# Patient Record
Sex: MALE | Race: WHITE | NOT HISPANIC OR LATINO | Employment: OTHER | RURAL
[De-identification: names, ages, dates, MRNs, and addresses within clinical notes are randomized per-mention and may not be internally consistent; named-entity substitution may affect disease eponyms.]

---

## 2024-07-31 ENCOUNTER — TRANSCRIBE ORDERS (OUTPATIENT)
Dept: PHYSICAL THERAPY | Facility: CLINIC | Age: 74
End: 2024-07-31
Payer: OTHER GOVERNMENT

## 2024-07-31 DIAGNOSIS — M54.41 LOW BACK PAIN WITH BILATERAL SCIATICA, UNSPECIFIED BACK PAIN LATERALITY, UNSPECIFIED CHRONICITY: Primary | ICD-10-CM

## 2024-07-31 DIAGNOSIS — M54.42 LOW BACK PAIN WITH BILATERAL SCIATICA, UNSPECIFIED BACK PAIN LATERALITY, UNSPECIFIED CHRONICITY: Primary | ICD-10-CM

## 2024-08-05 ENCOUNTER — TREATMENT (OUTPATIENT)
Dept: PHYSICAL THERAPY | Facility: CLINIC | Age: 74
End: 2024-08-05
Payer: OTHER GOVERNMENT

## 2024-08-05 DIAGNOSIS — M54.42 BILATERAL LOW BACK PAIN WITH BILATERAL SCIATICA, UNSPECIFIED CHRONICITY: Primary | ICD-10-CM

## 2024-08-05 DIAGNOSIS — M54.41 BILATERAL LOW BACK PAIN WITH BILATERAL SCIATICA, UNSPECIFIED CHRONICITY: Primary | ICD-10-CM

## 2024-08-05 PROCEDURE — 97112 NEUROMUSCULAR REEDUCATION: CPT | Performed by: PHYSICAL THERAPIST

## 2024-08-05 PROCEDURE — 97140 MANUAL THERAPY 1/> REGIONS: CPT | Performed by: PHYSICAL THERAPIST

## 2024-08-05 PROCEDURE — 97161 PT EVAL LOW COMPLEX 20 MIN: CPT | Performed by: PHYSICAL THERAPIST

## 2024-08-05 PROCEDURE — 97110 THERAPEUTIC EXERCISES: CPT | Performed by: PHYSICAL THERAPIST

## 2024-08-05 NOTE — PROGRESS NOTES
Physical Therapy Initial Evaluation and Plan of Care  [address] [facility], [address], [place]  [address]    Patient: Tima Bowman   : 1950  Diagnosis/ICD-10 Code:  Bilateral low back pain with bilateral sciatica, unspecified chronicity [M54.42, M54.41]  Referring practitioner: Migdalia Velazquez*  Date of Initial Visit: 2024  Today's Date: 2024  Patient seen for 1 sessions           Subjective Questionnaire: Oswestry: 30%      Subjective Evaluation    History of Present Illness  Mechanism of injury: Patient is a 74 y.o. WM who presents with chronic LBP with exacerbation 3 weeks ago of B radicular symptoms to calves upon standing/walking after sitting.  Pain has started to improve recently.  He started taking a new medication for nerve pain recently.  Personal goals are to decrease pain and get back to normal walking.  Pain rating ranges from 0/10 in sitting or supine to 8/10 upon initial walking.  He has a stationary bike at home but doesn't use it.  [place], [place] and active.  Active with [employer], served in [employer].    Patient Goals  Patient goals for therapy: decreased pain, improved balance, increased motion, increased strength and return to sport/leisure activities             Objective Oswestry function score indicates 30% impairment with limitations in walking, sleeping, pain.  AROM: flexion lacks 50%, extension lacks 50%, SB and rot lacks 50% B.  MMT:  4/5 B hip flexor, knee extension, otherwise 5/5 B LE.  Repeated sit to stand = 10+ in 30 sec without arms.  SLS = 4 sec L, 7 sec R.  Flexibility: min tight hip flexor B, mod/max tight hamstring, piriformis B.  No tenderness to palpation or increased radicular symptoms.  Ambulates independently without AD with minimal antalgia.        Assessment & Plan       Assessment  Impairments: abnormal gait, abnormal or restricted ROM, impaired balance, impaired physical strength, lacks appropriate home exercise program  and pain with function   Functional limitations: sleeping, walking, uncomfortable because of pain and standing     Goals  Plan Goals: Patient independent with HEP in 2 weeks  Tolerate 10 min Nustep in 2 weeks  Decrease back and leg pain 25% in 2 weeks  Able to ambulate independently without AD and minimal antalgia in 2 weeks  Improve function as evidenced by Oswestry score of 20% or less by discharge (30% impairment initially)  Able to walk without pain in 12 weeks      Plan  Therapy options: will be seen for skilled therapy services  Other planned modality interventions: physical modalities as needed  Planned therapy interventions: abdominal trunk stabilization, balance/weight-bearing training, flexibility, functional ROM exercises, gait training, home exercise program, manual therapy, neuromuscular re-education, postural training, strengthening, stretching and therapeutic activities  Frequency: 2x week  Duration in weeks: 12  Treatment plan discussed with: patient        Timed:         Manual Therapy:   10      mins  16884;     Therapeutic Exercise:    15     mins  16748;     Neuromuscular Tessie:    15    mins  08563;    Therapeutic Activity:          mins  96665;     Gait Training:           mins  85007;     Ultrasound:          mins  32975;    Self-care  ____ mins 62967    Un-Timed:  Electrical Stimulation:         mins  80843 ( );  Dry Needling          mins self-pay 86052  Traction          mins 90605  Low Eval     20     Mins  97594  Mod Eval          Mins  96688  Canal repositioning _____ mins  46058        Timed Treatment:   40   mins   Total Treatment:     60   mins    PT SIGNATURE: Robin A Sprigler, PT   [place] license # [id number]  Electronically signed by Robin A Sprigler, PT, 08/05/24, 9:13 AM EDT    Initial Certification  Certification Period: 8/5/2024 through 11/2/2024  I certify that the therapy services are furnished while this patient is under my care.  The services outlined above are required by  this patient, and will be reviewed every 90 days.     PHYSICIAN: Migdalia Velazquez, DO ______________________________________________________________________________________________     DATE: _________________________________________________________________________________________________________________________________    Please sign and return via fax to [phone]. Thank you, [facility].

## 2024-08-08 ENCOUNTER — TREATMENT (OUTPATIENT)
Dept: PHYSICAL THERAPY | Facility: CLINIC | Age: 74
End: 2024-08-08
Payer: OTHER GOVERNMENT

## 2024-08-08 DIAGNOSIS — M54.41 BILATERAL LOW BACK PAIN WITH BILATERAL SCIATICA, UNSPECIFIED CHRONICITY: Primary | ICD-10-CM

## 2024-08-08 DIAGNOSIS — M54.42 BILATERAL LOW BACK PAIN WITH BILATERAL SCIATICA, UNSPECIFIED CHRONICITY: Primary | ICD-10-CM

## 2024-08-08 NOTE — PROGRESS NOTES
Physical Therapy Daily Treatment Note    Patient: Tima Bowman   : 1950  Diagnosis/ICD-10 Code:  Bilateral low back pain with bilateral sciatica, unspecified chronicity [M54.42, M54.41]  Referring practitioner: Migdalia Velazquez*  Date of Initial Visit: Type: THERAPY  Noted: 2024  Today's Date: 2024  Patient seen for 2 sessions             Subjective Patient reports nothing new with symptoms yet.    Objective   See Exercise, Manual, and Modality Logs for complete treatment.  Initial Oswestry function score indicates 30% impairment with limitations in walking, sleeping, pain.  AROM: flexion lacks 50%, extension lacks 50%, SB and rot lacks 50% B.  MMT:  4/5 B hip flexor, knee extension, otherwise 5/5 B LE.  Repeated sit to stand = 10+ in 30 sec without arms.  SLS = 4 sec L, 7 sec R.  Flexibility: min tight hip flexor B, mod/max tight hamstring, piriformis B.  No tenderness to palpation or increased radicular symptoms.  Ambulates independently without AD with minimal antalgia.  Reviewed and updated HEP with rows/lats, marching, leg press.           Assessment/Plan  Patient independent with HEP in 2 weeks - NOT MET  Tolerate 10 min Nustep in 2 weeks - MET  Decrease back and leg pain 25% in 2 weeks - NOT MET  Able to ambulate independently without AD and minimal antalgia in 2 weeks - NOT MET  Improve function as evidenced by Oswestry score of 20% or less by discharge (30% impairment initially) - NOT MET  Able to walk without pain in 12 weeks - NOT MET     Progress per Plan of Care exp 24           Timed:         Manual Therapy:    8     mins  15702;     Therapeutic Exercise:    15     mins  69732;     Neuromuscular Tessie:    15    mins  25987;    Therapeutic Activity:          mins  61745;     Gait Training:           mins  94892;     Ultrasound:          mins  49661;    Ionto                                   mins   83424  Self Care                            mins    32021      Un-Timed:  Electrical Stimulation:         mins  78591 ( );  Dry Needling          mins self-pay  Traction          mins 70076  Low Eval          Mins  95539  Mod Eval          Mins  15539  High Eval                            Mins  57570  Canalith Repos                   mins  11100    Timed Treatment:   38   mins   Total Treatment:     38   mins    Robin A Sprigler, PT  Physical Therapist

## 2024-08-12 ENCOUNTER — TREATMENT (OUTPATIENT)
Dept: PHYSICAL THERAPY | Facility: CLINIC | Age: 74
End: 2024-08-12
Payer: OTHER GOVERNMENT

## 2024-08-12 DIAGNOSIS — M54.42 BILATERAL LOW BACK PAIN WITH BILATERAL SCIATICA, UNSPECIFIED CHRONICITY: Primary | ICD-10-CM

## 2024-08-12 DIAGNOSIS — M54.41 BILATERAL LOW BACK PAIN WITH BILATERAL SCIATICA, UNSPECIFIED CHRONICITY: Primary | ICD-10-CM

## 2024-08-12 NOTE — PROGRESS NOTES
Physical Therapy Daily Treatment Note    Patient: Tima Bowman   : 1950  Diagnosis/ICD-10 Code:  Bilateral low back pain with bilateral sciatica, unspecified chronicity [M54.42, M54.41]  Referring practitioner: Migdalia Velazquez*  Date of Initial Visit: Type: THERAPY  Noted: 2024  Today's Date: 2024  Patient seen for 3 sessions             Subjective Patient reports nothing new with symptoms yet.     Objective   See Exercise, Manual, and Modality Logs for complete treatment. Initial Oswestry function score indicates 30% impairment with limitations in walking, sleeping, pain.  AROM: flexion lacks 50%, extension lacks 50%, SB and rot lacks 50% B.  MMT:  4/5 B hip flexor, knee extension, otherwise 5/5 B LE.  Deconditioned abdominals with protruding abdomen.  Repeated sit to stand = 10+ in 30 sec without arms.  SLS = 10 sec L, 10 sec R.  Flexibility: min tight hip flexor B, mod/max tight hamstring, piriformis B.  No tenderness to palpation or increased radicular symptoms.  Ambulates independently without AD with minimal antalgia.  Reviewed and updated HEP with rows/lats, marching, leg press.           Assessment/Plan  Patient independent with HEP in 2 weeks - MET  Tolerate 10 min Nustep in 2 weeks - MET  Decrease back and leg pain 25% in 2 weeks - NOT MET  Able to ambulate independently without AD and minimal antalgia in 2 weeks - NOT MET  Improve function as evidenced by Oswestry score of 20% or less by discharge (30% impairment initially) - NOT MET  Able to walk without pain in 12 weeks - NOT MET    Progress per Plan of Care exp 10/28/24           Timed:         Manual Therapy:    8     mins  16205;     Therapeutic Exercise:    15     mins  55893;     Neuromuscular Tessie:    15    mins  68965;    Therapeutic Activity:          mins  99435;     Gait Training:           mins  01866;     Ultrasound:          mins  28391;    Ionto                                   mins   24225  Self Care                             mins   00409      Un-Timed:  Electrical Stimulation:         mins  21696 ( );  Dry Needling          mins self-pay  Traction          mins 38749  Low Eval          Mins  98639  Mod Eval          Mins  33194  High Eval                            Mins  56027  Canalith Repos                   mins  22604    Timed Treatment:    38  mins   Total Treatment:     38   mins    Robin A Sprigler, PT  Physical Therapist

## 2024-08-15 ENCOUNTER — TREATMENT (OUTPATIENT)
Dept: PHYSICAL THERAPY | Facility: CLINIC | Age: 74
End: 2024-08-15
Payer: OTHER GOVERNMENT

## 2024-08-15 DIAGNOSIS — M54.42 BILATERAL LOW BACK PAIN WITH BILATERAL SCIATICA, UNSPECIFIED CHRONICITY: Primary | ICD-10-CM

## 2024-08-15 DIAGNOSIS — M54.41 BILATERAL LOW BACK PAIN WITH BILATERAL SCIATICA, UNSPECIFIED CHRONICITY: Primary | ICD-10-CM

## 2024-08-15 NOTE — PROGRESS NOTES
Physical Therapy Daily Treatment Note    Patient: Tima Bowman   : 1950  Diagnosis/ICD-10 Code:  Bilateral low back pain with bilateral sciatica, unspecified chronicity [M54.42, M54.41]  Referring practitioner: Migdalia Velazquez*  Date of Initial Visit: Type: THERAPY  Noted: 2024  Today's Date: 8/15/2024  Patient seen for 4 sessions             Subjective Patient reports he's feeling better and planning to go to [place] on vacation now that he can walk better without pain.  He reports pain is primarily in bed or after sitting prolonged and first standing up, otherwise feels good.  No pain in legs.    Objective   See Exercise, Manual, and Modality Logs for complete treatment. Initial Oswestry function score indicates 30% impairment with limitations in walking, sleeping, pain.  AROM: flexion lacks 50%, extension lacks 50%, SB and rot lacks 50% B.  MMT:  4/5 B hip flexor, knee extension, otherwise 5/5 B LE.  Deconditioned abdominals with protruding abdomen.  Repeated sit to stand = 10+ in 30 sec without arms.  SLS = 10 sec L, 10 sec R.  Flexibility: min tight hip flexor B, mod/max tight hamstring, piriformis B.  No tenderness to palpation or increased radicular symptoms.  Ambulates independently without AD with minimal antalgia.  Reviewed and updated HEP with rows/lats, marching, leg press.           Assessment/Plan  Patient independent with HEP in 2 weeks - MET  Tolerate 10 min Nustep in 2 weeks - MET  Decrease back and leg pain 25% in 2 weeks - MET  Able to ambulate independently without AD and minimal antalgia in 2 weeks - NOT MET  Improve function as evidenced by Oswestry score of 20% or less by discharge (30% impairment initially) - NOT MET  Able to walk without pain in 12 weeks - PARTIALLY MET     Progress per Plan of Care exp 10/28/24            Timed:         Manual Therapy:    8     mins  80068;     Therapeutic Exercise:    15     mins  86526;     Neuromuscular Tessie:    15    mins   27416;    Therapeutic Activity:          mins  22610;     Gait Training:           mins  83043;     Ultrasound:          mins  99087;    Ionto                                   mins   76388  Self Care                            mins   59733      Un-Timed:  Electrical Stimulation:         mins  12559 ( );  Dry Needling          mins self-pay  Traction          mins 14259  Low Eval          Mins  57586  Mod Eval          Mins  77415  High Eval                            Mins  00414  Canalith Repos                   mins  45495    Timed Treatment:   38   mins   Total Treatment:     38   mins    Robin A Sprigler, PT  Physical Therapist

## 2024-08-19 ENCOUNTER — TREATMENT (OUTPATIENT)
Dept: PHYSICAL THERAPY | Facility: CLINIC | Age: 74
End: 2024-08-19
Payer: OTHER GOVERNMENT

## 2024-08-19 DIAGNOSIS — M54.42 BILATERAL LOW BACK PAIN WITH BILATERAL SCIATICA, UNSPECIFIED CHRONICITY: Primary | ICD-10-CM

## 2024-08-19 DIAGNOSIS — M54.41 BILATERAL LOW BACK PAIN WITH BILATERAL SCIATICA, UNSPECIFIED CHRONICITY: Primary | ICD-10-CM

## 2024-08-19 NOTE — PROGRESS NOTES
Physical Therapy Daily Treatment Note    Patient: Tima Bowman   : 1950  Diagnosis/ICD-10 Code:  Bilateral low back pain with bilateral sciatica, unspecified chronicity [M54.42, M54.41]  Referring practitioner: Migdalia Velazquez*  Date of Initial Visit: Type: THERAPY  Noted: 2024  Today's Date: 2024  Patient seen for 5 sessions             Subjective Still feeling good, had a nice weekend.  Leaves for vacation to [place] in 2 weeks.    Objective   See Exercise, Manual, and Modality Logs for complete treatment. Initial Oswestry function score indicates 30% impairment with limitations in walking, sleeping, pain. AROM: flexion lacks 50%, extension lacks 50%, SB and rot lacks 50% B. MMT: 4/5 B hip flexor, knee extension, otherwise 5/5 B LE. Deconditioned abdominals with protruding abdomen. Repeated sit to stand = 10+ in 30 sec without arms. SLS = 10 sec L, 10 sec R. Flexibility: min tight hip flexor B, mod/max tight hamstring, piriformis B. No tenderness to palpation or increased radicular symptoms. Ambulates independently without AD with minimal antalgia. Reviewed and updated HEP.       Assessment/Plan  Patient independent with HEP in 2 weeks - MET  Tolerate 10 min Nustep in 2 weeks - MET  Decrease back and leg pain 25% in 2 weeks - MET  Able to ambulate independently without AD and minimal antalgia in 2 weeks - MET  Improve function as evidenced by Oswestry score of 20% or less by discharge (30% impairment initially) - NOT MET  Able to walk without pain in 12 weeks - PARTIALLY MET     Progress per Plan of Care exp 10/28/24            Timed:         Manual Therapy:    8     mins  83364;     Therapeutic Exercise:    15     mins  46115;     Neuromuscular Tessie:    15    mins  75293;    Therapeutic Activity:          mins  69467;     Gait Training:           mins  61307;     Ultrasound:          mins  23647;    Ionto                                   mins   15728  Self Care                             mins   94807      Un-Timed:  Electrical Stimulation:         mins  08715 ( );  Dry Needling          mins self-pay  Traction          mins 73572  Low Eval          Mins  14836  Mod Eval          Mins  28226  High Eval                            Mins  74925  Canalith Repos                   mins  31728    Timed Treatment:   38   mins   Total Treatment:     38   mins    Robin A Sprigler, PT  Physical Therapist

## 2024-08-22 ENCOUNTER — TREATMENT (OUTPATIENT)
Dept: PHYSICAL THERAPY | Facility: CLINIC | Age: 74
End: 2024-08-22
Payer: OTHER GOVERNMENT

## 2024-08-22 DIAGNOSIS — M54.41 BILATERAL LOW BACK PAIN WITH BILATERAL SCIATICA, UNSPECIFIED CHRONICITY: Primary | ICD-10-CM

## 2024-08-22 DIAGNOSIS — M54.42 BILATERAL LOW BACK PAIN WITH BILATERAL SCIATICA, UNSPECIFIED CHRONICITY: Primary | ICD-10-CM

## 2024-08-22 NOTE — PROGRESS NOTES
Physical Therapy Daily Treatment Note    Patient: Tima Bowman   : 1950  Diagnosis/ICD-10 Code:  Bilateral low back pain with bilateral sciatica, unspecified chronicity [M54.42, M54.41]  Referring practitioner: Migdalia Velazquez*  Date of Initial Visit: Type: THERAPY  Noted: 2024  Today's Date: 2024  Patient seen for 6 sessions             Subjective No new complaints today.    Objective   See Exercise, Manual, and Modality Logs for complete treatment. Initial Oswestry function score indicates 30% impairment with limitations in walking, sleeping, pain. AROM: flexion lacks 50%, extension lacks 50%, SB and rot lacks 50% B. MMT: 4/5 B hip flexor, knee extension, otherwise 5/5 B LE. Deconditioned abdominals with protruding abdomen. Repeated sit to stand = 10+ in 30 sec without arms. SLS = 16 sec L, 19 sec R. Flexibility: min tight hip flexor B, mod/max tight hamstring, piriformis B. No tenderness to palpation or increased radicular symptoms. Ambulates independently without AD with minimal antalgia. Reviewed and updated HEP.       Assessment/Plan  Patient independent with HEP in 2 weeks - MET  Tolerate 10 min Nustep in 2 weeks - MET  Decrease back and leg pain 25% in 2 weeks - MET  Able to ambulate independently without AD and minimal antalgia in 2 weeks - MET  Improve function as evidenced by Oswestry score of 20% or less by discharge (30% impairment initially) - NOT MET  Able to walk without pain in 12 weeks - PARTIALLY MET     Progress per Plan of Care exp 10/28/24          Timed:         Manual Therapy:    8     mins  08616;     Therapeutic Exercise:    15     mins  94524;     Neuromuscular Tessie:    15    mins  69012;    Therapeutic Activity:          mins  05213;     Gait Training:           mins  65622;     Ultrasound:          mins  88370;    Ionto                                   mins   53151  Self Care                            mins   08858      Un-Timed:  Electrical Stimulation:         mins  65496 ( );  Dry Needling          mins self-pay  Traction          mins 61761  Low Eval          Mins  11219  Mod Eval          Mins  39330  High Eval                            Mins  30577  Canalith Repos                   mins  75612    Timed Treatment:   38   mins   Total Treatment:     38   mins    Robin A Sprigler, PT  Physical Therapist

## 2024-08-26 ENCOUNTER — TREATMENT (OUTPATIENT)
Dept: PHYSICAL THERAPY | Facility: CLINIC | Age: 74
End: 2024-08-26
Payer: OTHER GOVERNMENT

## 2024-08-26 DIAGNOSIS — M54.41 BILATERAL LOW BACK PAIN WITH BILATERAL SCIATICA, UNSPECIFIED CHRONICITY: Primary | ICD-10-CM

## 2024-08-26 DIAGNOSIS — M54.42 BILATERAL LOW BACK PAIN WITH BILATERAL SCIATICA, UNSPECIFIED CHRONICITY: Primary | ICD-10-CM

## 2024-08-26 NOTE — PROGRESS NOTES
Physical Therapy Daily Treatment Note    Patient: Tima Bowman   : 1950  Diagnosis/ICD-10 Code:  Bilateral low back pain with bilateral sciatica, unspecified chronicity [M54.42, M54.41]  Referring practitioner: Migdalia Velazquez*  Date of Initial Visit: Type: THERAPY  Noted: 2024  Today's Date: 2024  Patient seen for 7 sessions             Subjective Patient reports he had a good weekend but ready for cooler temperatures.  Looking forward to trip to [place] next week.    Objective   See Exercise, Manual, and Modality Logs for complete treatment.  Initial Oswestry function score indicates 30% impairment with limitations in walking, sleeping, pain. AROM: flexion lacks 50%, extension lacks 50%, SB and rot lacks 50% B. MMT: 4/5 B hip flexor, knee extension, otherwise 5/5 B LE. Deconditioned abdominals with protruding abdomen. Repeated sit to stand = 10+ in 30 sec without arms. SLS = 19 sec L, 30 sec R. Flexibility: min tight hip flexor B, mod/max tight hamstring, piriformis B. No tenderness to palpation or increased radicular symptoms. Ambulates independently without AD with minimal antalgia. Reviewed and updated HEP.       Assessment/Plan  Patient independent with HEP in 2 weeks - MET  Tolerate 10 min Nustep in 2 weeks - MET  Decrease back and leg pain 25% in 2 weeks - MET  Able to ambulate independently without AD and minimal antalgia in 2 weeks - MET  Improve function as evidenced by Oswestry score of 20% or less by discharge (30% impairment initially) - NOT MET  Able to walk without pain in 12 weeks - PARTIALLY MET     Progress per Plan of Care exp 10/28/24           Timed:         Manual Therapy:    8     mins  75957;     Therapeutic Exercise:    15     mins  19995;     Neuromuscular Tessie:    15    mins  10082;    Therapeutic Activity:          mins  85703;     Gait Training:           mins  63524;     Ultrasound:          mins  14357;    Ionto                                   mins    75394  Self Care                            mins   55683      Un-Timed:  Electrical Stimulation:         mins  26677 ( );  Dry Needling          mins self-pay  Traction          mins 46930  Low Eval          Mins  28651  Mod Eval          Mins  83143  High Eval                            Mins  59523  Canalith Repos                   mins  36921    Timed Treatment:   38   mins   Total Treatment:     38   mins    Robin A Sprigler, PT  Physical Therapist

## 2024-08-29 ENCOUNTER — TREATMENT (OUTPATIENT)
Dept: PHYSICAL THERAPY | Facility: CLINIC | Age: 74
End: 2024-08-29
Payer: OTHER GOVERNMENT

## 2024-08-29 DIAGNOSIS — M54.41 BILATERAL LOW BACK PAIN WITH BILATERAL SCIATICA, UNSPECIFIED CHRONICITY: Primary | ICD-10-CM

## 2024-08-29 DIAGNOSIS — M54.42 BILATERAL LOW BACK PAIN WITH BILATERAL SCIATICA, UNSPECIFIED CHRONICITY: Primary | ICD-10-CM

## 2024-08-29 NOTE — PROGRESS NOTES
Physical Therapy Daily Treatment Note    Patient: Tima Bowman   : 1950  Diagnosis/ICD-10 Code:  Bilateral low back pain with bilateral sciatica, unspecified chronicity [M54.42, M54.41]  Referring practitioner: Migdalia Velazquez*  Date of Initial Visit: Type: THERAPY  Noted: 2024  Today's Date: 2024  Patient seen for 8 sessions             Subjective Patient looking forward to [place] vacation.    Objective   See Exercise, Manual, and Modality Logs for complete treatment. Oswestry function score indicates 24% impairment, improved from 30% initially with limitations in walking, sleeping, pain. AROM: flexion lacks 50%, extension lacks 50%, SB and rot lacks 50% B. MMT: 4/5 B hip flexor, knee extension, otherwise 5/5 B LE. Deconditioned abdominals with protruding abdomen. Repeated sit to stand = 10+ in 30 sec without arms. SLS = 19 sec L, 30 sec R. Flexibility: min tight hip flexor B, mod/max tight hamstring, piriformis B. No tenderness to palpation or increased radicular symptoms. Ambulates independently without AD with minimal antalgia. Reviewed and updated HEP.       Assessment/Plan  Patient independent with HEP in 2 weeks - MET  Tolerate 10 min Nustep in 2 weeks - MET  Decrease back and leg pain 25% in 2 weeks - MET  Able to ambulate independently without AD and minimal antalgia in 2 weeks - MET  Improve function as evidenced by Oswestry score of 20% or less by discharge (30% impairment initially) - PARTIALLY MET, 24% on 24  Able to walk without pain in 12 weeks - PARTIALLY MET    Progress per Plan of Care exp 10/28/24            Timed:         Manual Therapy:    8     mins  77225;     Therapeutic Exercise:    15     mins  31311;     Neuromuscular Tessie:    15    mins  69067;    Therapeutic Activity:          mins  87071;     Gait Training:           mins  99287;     Ultrasound:          mins  99498;    Ionto                                   mins   67414  Self Care                             mins   89018      Un-Timed:  Electrical Stimulation:         mins  77225 ( );  Dry Needling          mins self-pay  Traction          mins 49682  Low Eval          Mins  91115  Mod Eval          Mins  42599  High Eval                            Mins  79191  Canalith Repos                   mins  27142    Timed Treatment:   38   mins   Total Treatment:     38   mins    Robin A Sprigler, PT  Physical Therapist

## 2024-09-16 ENCOUNTER — TREATMENT (OUTPATIENT)
Dept: PHYSICAL THERAPY | Facility: CLINIC | Age: 74
End: 2024-09-16
Payer: OTHER GOVERNMENT

## 2024-09-16 DIAGNOSIS — M54.42 BILATERAL LOW BACK PAIN WITH BILATERAL SCIATICA, UNSPECIFIED CHRONICITY: Primary | ICD-10-CM

## 2024-09-16 DIAGNOSIS — M54.41 BILATERAL LOW BACK PAIN WITH BILATERAL SCIATICA, UNSPECIFIED CHRONICITY: Primary | ICD-10-CM

## 2024-09-16 PROCEDURE — 97112 NEUROMUSCULAR REEDUCATION: CPT | Performed by: PHYSICAL THERAPIST

## 2024-09-16 PROCEDURE — 97140 MANUAL THERAPY 1/> REGIONS: CPT | Performed by: PHYSICAL THERAPIST

## 2024-09-16 PROCEDURE — 97110 THERAPEUTIC EXERCISES: CPT | Performed by: PHYSICAL THERAPIST

## 2024-09-19 ENCOUNTER — TREATMENT (OUTPATIENT)
Dept: PHYSICAL THERAPY | Facility: CLINIC | Age: 74
End: 2024-09-19
Payer: OTHER GOVERNMENT

## 2024-09-19 DIAGNOSIS — M54.42 BILATERAL LOW BACK PAIN WITH BILATERAL SCIATICA, UNSPECIFIED CHRONICITY: Primary | ICD-10-CM

## 2024-09-19 DIAGNOSIS — M54.41 BILATERAL LOW BACK PAIN WITH BILATERAL SCIATICA, UNSPECIFIED CHRONICITY: Primary | ICD-10-CM

## 2024-09-23 ENCOUNTER — TREATMENT (OUTPATIENT)
Dept: PHYSICAL THERAPY | Facility: CLINIC | Age: 74
End: 2024-09-23
Payer: OTHER GOVERNMENT

## 2024-09-23 DIAGNOSIS — M54.42 BILATERAL LOW BACK PAIN WITH BILATERAL SCIATICA, UNSPECIFIED CHRONICITY: Primary | ICD-10-CM

## 2024-09-23 DIAGNOSIS — M54.41 BILATERAL LOW BACK PAIN WITH BILATERAL SCIATICA, UNSPECIFIED CHRONICITY: Primary | ICD-10-CM

## 2024-09-23 PROCEDURE — 97110 THERAPEUTIC EXERCISES: CPT | Performed by: PHYSICAL THERAPIST

## 2024-09-23 PROCEDURE — 97112 NEUROMUSCULAR REEDUCATION: CPT | Performed by: PHYSICAL THERAPIST

## 2024-09-23 PROCEDURE — 97140 MANUAL THERAPY 1/> REGIONS: CPT | Performed by: PHYSICAL THERAPIST

## 2024-09-26 ENCOUNTER — TREATMENT (OUTPATIENT)
Dept: PHYSICAL THERAPY | Facility: CLINIC | Age: 74
End: 2024-09-26
Payer: OTHER GOVERNMENT

## 2024-09-26 DIAGNOSIS — M54.41 BILATERAL LOW BACK PAIN WITH BILATERAL SCIATICA, UNSPECIFIED CHRONICITY: Primary | ICD-10-CM

## 2024-09-26 DIAGNOSIS — M54.42 BILATERAL LOW BACK PAIN WITH BILATERAL SCIATICA, UNSPECIFIED CHRONICITY: Primary | ICD-10-CM

## 2024-10-02 ENCOUNTER — TREATMENT (OUTPATIENT)
Dept: PHYSICAL THERAPY | Facility: CLINIC | Age: 74
End: 2024-10-02
Payer: OTHER GOVERNMENT

## 2024-10-02 DIAGNOSIS — M54.42 BILATERAL LOW BACK PAIN WITH BILATERAL SCIATICA, UNSPECIFIED CHRONICITY: Primary | ICD-10-CM

## 2024-10-02 DIAGNOSIS — M54.41 BILATERAL LOW BACK PAIN WITH BILATERAL SCIATICA, UNSPECIFIED CHRONICITY: Primary | ICD-10-CM

## 2024-10-02 PROCEDURE — 97110 THERAPEUTIC EXERCISES: CPT

## 2024-10-02 PROCEDURE — 97140 MANUAL THERAPY 1/> REGIONS: CPT

## 2024-10-02 PROCEDURE — 97112 NEUROMUSCULAR REEDUCATION: CPT

## 2024-10-02 NOTE — PROGRESS NOTES
Physical Therapy Daily Treatment Note      Patient: Tima Bowman   : 1950  Diagnosis/ICD-10 Code:  Bilateral low back pain with bilateral sciatica, unspecified chronicity [M54.42, M54.41]  Referring practitioner: Migdalia Velazquez*  Date of Initial Visit: Type: THERAPY  Noted: 2024  Today's Date: 10/2/2024  Patient seen for 13 sessions         Tima Bowman reports: he has been doing okay stating the exercises don't seem to worsen his pain but his pain still varies daily sometimes In both legs sometimes in one not the other an occasionally only in his back which he understands is a good thing since it is no longer in the lower parts of his legs.    Objective   See Exercise, Manual, and Modality Logs for complete treatment.     Assessment/Plan  Pt. tolerates exercises well and has no difficulty completing them this date Pt. Is very tight in B HS's and hip flexors. Pt. Responds fair to stretches but struggles to relax full to receive good overpressure.    Patient independent with HEP in 2 weeks - MET  Tolerate 10 min Nustep in 2 weeks - MET  Decrease back and leg pain 25% in 2 weeks - MET  Able to ambulate independently without AD and minimal antalgia in 2 weeks - MET  Improve function as evidenced by Oswestry score of 20% or less by discharge (30% impairment initially) - PARTIALLY MET, 24% on 24  Able to walk without pain in 12 weeks - PARTIALLY MET    Progress strengthening /stabilization /functional activity           Timed:         Manual Therapy:    15     mins  66276;     Therapeutic Exercise:    15     mins  15266;     Neuromuscular Tessie:    15    mins  48884;      Timed Treatment:   45   mins   Total Treatment:     45   mins    Tomasa Ramos PTA  Physical Therapist Assistant License #[id number]

## 2024-10-07 ENCOUNTER — TREATMENT (OUTPATIENT)
Dept: PHYSICAL THERAPY | Facility: CLINIC | Age: 74
End: 2024-10-07
Payer: OTHER GOVERNMENT

## 2024-10-07 DIAGNOSIS — M54.41 BILATERAL LOW BACK PAIN WITH BILATERAL SCIATICA, UNSPECIFIED CHRONICITY: Primary | ICD-10-CM

## 2024-10-07 DIAGNOSIS — M54.42 BILATERAL LOW BACK PAIN WITH BILATERAL SCIATICA, UNSPECIFIED CHRONICITY: Primary | ICD-10-CM

## 2024-10-07 NOTE — PROGRESS NOTES
Physical Therapy Daily Treatment Note    Patient: Tima Bowman   : 1950  Diagnosis/ICD-10 Code:  Bilateral low back pain with bilateral sciatica, unspecified chronicity [M54.42, M54.41]  Referring practitioner: Migdalia Velazquez*  Date of Initial Visit: Type: THERAPY  Noted: 2024  Today's Date: 10/7/2024  Patient seen for 14 sessions             Subjective Patient has no new complaints today.    Objective   See Exercise, Manual, and Modality Logs for complete treatment.  Oswestry function score indicates 24% impairment, improved from 30% initially with limitations in walking, sleeping, pain. AROM: flexion lacks 50%, extension lacks 50%, SB and rot lacks 50% B. MMT: 4/5 B hip flexor, knee extension, otherwise 5/5 B LE. Deconditioned abdominals with protruding abdomen. Repeated sit to stand = 10+ in 30 sec without arms. SLS = 30 sec L, 30 sec R. Flexibility: min tight hip flexor B, mod/max tight hamstring, piriformis B. No tenderness to palpation or increased radicular symptoms. Ambulates independently without AD with minimal antalgia. Reviewed and updated HEP. Plan Oswestry, re-eval and discharge next visit.         Assessment/Plan  Patient independent with HEP in 2 weeks - MET  Tolerate 10 min Nustep in 2 weeks - MET  Decrease back and leg pain 25% in 2 weeks - MET  Able to ambulate independently without AD and minimal antalgia in 2 weeks - MET  Improve function as evidenced by Oswestry score of 20% or less by discharge (30% impairment initially) - PARTIALLY MET, 24% on 24  Able to walk without pain in 12 weeks - PARTIALLY MET    Progress per Plan of Care and Anticipate DC next Visit exp 10/28/24 up to 15 visits [facility]            Timed:         Manual Therapy:    8     mins  64797;     Therapeutic Exercise:    15     mins  68872;     Neuromuscular Tessie:    15    mins  25982;    Therapeutic Activity:          mins  92557;     Gait Training:           mins  13537;     Ultrasound:           mins  21208;    Ionto                                   mins   42134  Self Care                            mins   31768      Un-Timed:  Electrical Stimulation:         mins  91736 ( );  Dry Needling          mins self-pay  Traction          mins 48134  Low Eval          Mins  83687  Mod Eval          Mins  64017  High Eval                            Mins  27670  Canalith Repos                   mins  42395    Timed Treatment:   38   mins   Total Treatment:     38   mins    Robin A Sprigler, PT  Physical Therapist

## 2024-10-10 ENCOUNTER — TREATMENT (OUTPATIENT)
Dept: PHYSICAL THERAPY | Facility: CLINIC | Age: 74
End: 2024-10-10
Payer: OTHER GOVERNMENT

## 2024-10-10 DIAGNOSIS — M54.41 BILATERAL LOW BACK PAIN WITH BILATERAL SCIATICA, UNSPECIFIED CHRONICITY: Primary | ICD-10-CM

## 2024-10-10 DIAGNOSIS — M54.42 BILATERAL LOW BACK PAIN WITH BILATERAL SCIATICA, UNSPECIFIED CHRONICITY: Primary | ICD-10-CM

## 2024-10-10 NOTE — PROGRESS NOTES
Discharge Summary  Discharge Summary from Physical Therapy Report      Goals: Partially Met    Discharge Plan: Continue with current home exercise program as instructed    Comments Patient voices readiness for discharge    Date of Discharge 10/10/24    Robin A Sprigler, PT  Physical Therapist        Physical Therapy Progress Note / Reassessment  [address] [facility], [address], [facility] [place]  [address]    Patient: Tima Bowman   : 1950  Diagnosis/ICD-10 Code:  Bilateral low back pain with bilateral sciatica, unspecified chronicity [M54.42, M54.41]  Referring practitioner: Migdalia Velazquez*  Date of Initial Evaluation:  Type: THERAPY  Noted: 2024  Patient seen for 15 sessions      Subjective:   Visit Diagnoses:    ICD-10-CM ICD-9-CM   1. Bilateral low back pain with bilateral sciatica, unspecified chronicity  M54.42 724.3    M54.41 724.2         Tima Bowman reports he is feeling pretty good today.  Subjective Questionnaire: Oswestry: 26%  Clinical Progress: improved  Home Program Compliance: Yes  Treatment has included: therapeutic exercise, neuromuscular re-education, manual therapy, and therapeutic activity      Subjective Still has pain with walking more that quarter mile but has improved flexibility and strength.      Objective Oswestry function score indicates 26% impairment on 10/10/24, improved from 30% initially with limitations in walking, sleeping, pain. AROM: flexion lacks 25%, extension lacks 50%, SB and rot lacks 50% B. MMT: 4/5 B hip flexor, knee extension, otherwise 5/5 B LE. Deconditioned abdominals with protruding abdomen. Repeated sit to stand = 10+ in 30 sec without arms. SLS = 30 sec L, 30 sec R. Flexibility: min tight hip flexor B, mod/max tight hamstring, piriformis B. No tenderness to palpation or increased radicular symptoms. Ambulates independently without AD with minimal antalgia. Reviewed and updated HEP. Re-eval completed and goals assessed.         Assessment/Plan  Patient  independent with HEP in 2 weeks - MET  Tolerate 10 min Nustep in 2 weeks - MET  Decrease back and leg pain 25% in 2 weeks - MET  Able to ambulate independently without AD and minimal antalgia in 2 weeks - MET  Improve function as evidenced by Oswestry score of 20% or less by discharge (30% impairment initially) - PARTIALLY MET, 24% on 8/29/24  Able to walk without pain in 12 weeks - PARTIALLY MET up to quarter mile     Recommendations: Discharge  Timeframe:  N/A  Prognosis to achieve goals: good      Timed:         Manual Therapy:    8     mins  96218;     Therapeutic Exercise:    15     mins  35970;     Neuromuscular Tessie:    15    mins  79943;    Therapeutic Activity:          mins  34816;     Gait Training:           mins  60231;     Ultrasound:          mins  25910;    Ionto                                   mins   10632  Self Care                            mins   11961    Un-Timed:  Re-eval:    10     mins  08362   Dry Needling          mins self-pay  Traction          mins 40948  Canalith Repos         mins 43443    Timed Treatment:   38   mins   Total Treatment:     48   mins    PT Signature: Robin A Sprigler, PT  [place] License: [id number]